# Patient Record
Sex: FEMALE | Race: WHITE
[De-identification: names, ages, dates, MRNs, and addresses within clinical notes are randomized per-mention and may not be internally consistent; named-entity substitution may affect disease eponyms.]

---

## 2016-11-11 VITALS — SYSTOLIC BLOOD PRESSURE: 140 MMHG | DIASTOLIC BLOOD PRESSURE: 70 MMHG

## 2017-05-04 ENCOUNTER — HOSPITAL ENCOUNTER (OUTPATIENT)
Dept: HOSPITAL 61 - LAB | Age: 27
Discharge: HOME | End: 2017-05-04
Attending: OBSTETRICS & GYNECOLOGY
Payer: COMMERCIAL

## 2017-05-04 DIAGNOSIS — Z32.01: Primary | ICD-10-CM

## 2017-05-04 LAB
BASOPHILS # BLD AUTO: 0.1 X10^3/UL (ref 0–0.2)
BASOPHILS NFR BLD: 1 % (ref 0–3)
EOSINOPHIL NFR BLD: 2 % (ref 0–3)
ERYTHROCYTE [DISTWIDTH] IN BLOOD BY AUTOMATED COUNT: 12.5 % (ref 11.5–14.5)
HCT VFR BLD CALC: 40.5 % (ref 36–47)
HGB BLD-MCNC: 13.9 G/DL (ref 12–15.5)
LYMPHOCYTES # BLD: 4 X10^3/UL (ref 1–4.8)
LYMPHOCYTES NFR BLD AUTO: 29 % (ref 24–48)
MCH RBC QN AUTO: 31 PG (ref 25–35)
MCHC RBC AUTO-ENTMCNC: 34 G/DL (ref 31–37)
MCV RBC AUTO: 92 FL (ref 79–100)
MONOCYTES NFR BLD: 8 % (ref 0–9)
NEUTROPHILS NFR BLD AUTO: 61 % (ref 31–73)
PLATELET # BLD AUTO: 222 X10^3/UL (ref 140–400)
RBC # BLD AUTO: 4.42 X10^6/UL (ref 3.5–5.4)
WBC # BLD AUTO: 13.8 X10^3/UL (ref 4–11)

## 2017-05-04 PROCEDURE — 86762 RUBELLA ANTIBODY: CPT

## 2017-05-04 PROCEDURE — 86850 RBC ANTIBODY SCREEN: CPT

## 2017-05-04 PROCEDURE — 85027 COMPLETE CBC AUTOMATED: CPT

## 2017-05-04 PROCEDURE — 87340 HEPATITIS B SURFACE AG IA: CPT

## 2017-05-04 PROCEDURE — 86901 BLOOD TYPING SEROLOGIC RH(D): CPT

## 2017-05-04 PROCEDURE — 87341 HEP B SURFACE AG NEUTRLZJ IA: CPT

## 2017-05-04 PROCEDURE — 86900 BLOOD TYPING SEROLOGIC ABO: CPT

## 2017-05-04 PROCEDURE — 36415 COLL VENOUS BLD VENIPUNCTURE: CPT

## 2017-05-04 PROCEDURE — 86703 HIV-1/HIV-2 1 RESULT ANTBDY: CPT

## 2017-05-04 PROCEDURE — 86593 SYPHILIS TEST NON-TREP QUANT: CPT

## 2017-05-08 ENCOUNTER — HOSPITAL ENCOUNTER (EMERGENCY)
Dept: HOSPITAL 61 - ER | Age: 27
Discharge: HOME | End: 2017-05-08
Payer: COMMERCIAL

## 2017-05-08 VITALS
DIASTOLIC BLOOD PRESSURE: 95 MMHG | DIASTOLIC BLOOD PRESSURE: 95 MMHG | DIASTOLIC BLOOD PRESSURE: 95 MMHG | SYSTOLIC BLOOD PRESSURE: 162 MMHG | DIASTOLIC BLOOD PRESSURE: 95 MMHG | SYSTOLIC BLOOD PRESSURE: 162 MMHG | SYSTOLIC BLOOD PRESSURE: 162 MMHG | DIASTOLIC BLOOD PRESSURE: 95 MMHG | DIASTOLIC BLOOD PRESSURE: 95 MMHG | SYSTOLIC BLOOD PRESSURE: 162 MMHG | DIASTOLIC BLOOD PRESSURE: 95 MMHG | SYSTOLIC BLOOD PRESSURE: 162 MMHG | SYSTOLIC BLOOD PRESSURE: 162 MMHG | SYSTOLIC BLOOD PRESSURE: 162 MMHG

## 2017-05-08 VITALS — BODY MASS INDEX: 40.65 KG/M2 | HEIGHT: 67 IN | WEIGHT: 259 LBS

## 2017-05-08 DIAGNOSIS — Z90.49: ICD-10-CM

## 2017-05-08 DIAGNOSIS — Z3A.01: ICD-10-CM

## 2017-05-08 DIAGNOSIS — O46.91: Primary | ICD-10-CM

## 2017-05-08 LAB
BASOPHILS # BLD AUTO: 0.1 X10^3/UL (ref 0–0.2)
BASOPHILS NFR BLD: 1 % (ref 0–3)
EOSINOPHIL NFR BLD: 2 % (ref 0–3)
ERYTHROCYTE [DISTWIDTH] IN BLOOD BY AUTOMATED COUNT: 12.8 % (ref 11.5–14.5)
HCT VFR BLD CALC: 43.5 % (ref 36–47)
HGB BLD-MCNC: 15.2 G/DL (ref 12–15.5)
LYMPHOCYTES # BLD: 3.5 X10^3/UL (ref 1–4.8)
LYMPHOCYTES NFR BLD AUTO: 28 % (ref 24–48)
MCH RBC QN AUTO: 32 PG (ref 25–35)
MCHC RBC AUTO-ENTMCNC: 35 G/DL (ref 31–37)
MCV RBC AUTO: 91 FL (ref 79–100)
MONOCYTES NFR BLD: 8 % (ref 0–9)
NEUTROPHILS NFR BLD AUTO: 61 % (ref 31–73)
PLATELET # BLD AUTO: 249 X10^3/UL (ref 140–400)
RBC # BLD AUTO: 4.78 X10^6/UL (ref 3.5–5.4)
WBC # BLD AUTO: 12.4 X10^3/UL (ref 4–11)

## 2017-05-08 PROCEDURE — 85027 COMPLETE CBC AUTOMATED: CPT

## 2017-05-08 PROCEDURE — 76817 TRANSVAGINAL US OBSTETRIC: CPT

## 2017-05-08 PROCEDURE — 76801 OB US < 14 WKS SINGLE FETUS: CPT

## 2017-05-08 PROCEDURE — 36415 COLL VENOUS BLD VENIPUNCTURE: CPT

## 2017-05-08 PROCEDURE — 84702 CHORIONIC GONADOTROPIN TEST: CPT

## 2017-05-08 PROCEDURE — 81025 URINE PREGNANCY TEST: CPT

## 2017-05-08 PROCEDURE — 86901 BLOOD TYPING SEROLOGIC RH(D): CPT

## 2017-05-08 PROCEDURE — 86900 BLOOD TYPING SEROLOGIC ABO: CPT

## 2017-05-08 NOTE — RAD
Indication: Vaginal bleeding and pregnancy.



Transabdominal and transvaginal sonography was performed. The uterus measures

9.7 x 5.0 x 5.5 cm. The endometrium is thickened measuring 13 mm. No

myometrial mass is identified. No intrauterine gestational sac is present.

Adnexal evaluation demonstrates the right ovary to measure 2.4 x 1.7 x 2.1 cm.

The left ovary measures 3.1 x 2.3 cm and contains a 1.8 cm cyst. No free fluid

or other mass is detected.



Impression: No evidence of intrauterine or ectopic pregnancy. There is some

thickening of the endometrium which could be a decidual reaction from early

pregnancy. Follow-up could be performed. Patient does have a 1.8 cm left

ovarian cyst.

## 2017-05-08 NOTE — ED.ADGEN
Past Medical History


Past Medical History:  No Pertinent History


Past Surgical History:  Cholecystectomy


Alcohol Use:  None


Drug Use:  None





Adult General


Chief Complaint


Chief Complaint:  VAGINAL BLEEDING PREGNANCY





HPI


HPI


Patient is a 26  year old G3, P2 1 pregnant female with estimated gestation 5 

week who presents with intermittent vaginal bleeding with dull burning pelvic 

pain since yesterday. Patient ports minimal bleeding, which has been both dark 

brown and bright red at times. She currently denies any pain or bleeding. She 

denies dizziness lightheadedness or shortness of breath. Patient has been 

working with her fertility specialist/OB Dr. Page and was taking Clomid and 

Glucophage prior to conceiving and is currently on Provera. She contacted her OB

/GYN's office and was instructed to come to the emergency department today.





Review of Systems


Review of Systems


ROS as per [facility]





Allergies


Allergies





Allergies








Coded Allergies Type Severity Reaction Last Updated Verified


 


  No Known Drug Allergies    11/11/16 No











Physical Exam


Physical Exam





Constitutional: Well developed, well nourished, no acute distress, non-toxic 

appearance. []


HENT: Normocephalic, atraumatic, bilateral external ears normal, oropharynx 

moist, no oral exudates, nose normal. []


Eyes: PERRLA, EOMI, conjunctiva normal, no discharge. [] 


Neck: Normal range of motion, no tenderness, supple, no stridor. [] 


Cardiovascular:Heart rate regular rhythm, no murmur []


Lungs & Thorax:  Bilateral breath sounds clear to auscultation []


Abdomen: Bowel sounds normal, soft, no tenderness, no masses, no pulsatile 

masses. [] 


Skin: Warm, dry, no erythema, no rash. [] 


Back: No tenderness, no CVA tenderness. [] 


Extremities: No tenderness, no cyanosis, no clubbing, ROM intact, no edema. [] 


Neurologic: Alert and oriented X 3, normal motor function, normal sensory 

function, no focal deficits noted. []


Psychologic: Affect normal, judgement normal, mood normal. []





Current Patient Data


Vital Signs





 Vital Signs








  Date Time  Temp Pulse Resp B/P (MAP) Pulse Ox O2 Delivery O2 Flow Rate FiO2


 


5/8/17 12:05 98.6 106 16 162/95 (117) 98 Room Air  





 98.6       








Lab Values





 Laboratory Tests








Test


  5/8/17


11:24 5/8/17


12:25


 


POC Urine HCG, Qualitative


  Hcg positive


(Negative) 


 


 


White Blood Count


  


  12.4 x10^3/uL


(4.0-11.0)  H


 


Red Blood Count


  


  4.78 x10^6/uL


(3.50-5.40)


 


Hemoglobin


  


  15.2 g/dL


(12.0-15.5)


 


Hematocrit


  


  43.5 %


(36.0-47.0)


 


Mean Corpuscular Volume


  


  91 fL ()


 


 


Mean Corpuscular Hemoglobin  32 pg (25-35)  


 


Mean Corpuscular Hemoglobin


Concent 


  35 g/dL


(31-37)


 


Red Cell Distribution Width


  


  12.8 %


(11.5-14.5)


 


Platelet Count


  


  249 x10^3/uL


(140-400)


 


Neutrophils (%) (Auto)  61 % (31-73)  


 


Lymphocytes (%) (Auto)  28 % (24-48)  


 


Monocytes (%) (Auto)  8 % (0-9)  


 


Eosinophils (%) (Auto)  2 % (0-3)  


 


Basophils (%) (Auto)  1 % (0-3)  


 


Neutrophils # (Auto)


  


  7.6 x10^3uL


(1.8-7.7)


 


Lymphocytes # (Auto)


  


  3.5 x10^3/uL


(1.0-4.8)


 


Monocytes # (Auto)


  


  0.9 x10^3/uL


(0.0-1.1)


 


Eosinophils # (Auto)


  


  0.3 x10^3/uL


(0.0-0.7)


 


Basophils # (Auto)


  


  0.1 x10^3/uL


(0.0-0.2)


 


Maternal Serum HCG Beta


Subunit 


  148 mIU/mL


(0-6)  H





 Laboratory Tests


5/8/17 12:25














EKG


EKG


[]





Radiology/Procedures


Radiology/Procedures


[The ultrasound less than 14 weeks: No evidence of IUP or adnexal mass 

concerning for ectopic pregnancy.]


Impressions:


Early pregnancy versus threatened miscarriage versus ectopic pregnancy.





Course & Med Decision Making


Course & Med Decision Making


Pertinent Labs and Imaging studies reviewed. (See chart for details)





[Case reviewed with Dr. Page. Conditions are to return home follow-up in 

clinic in one week. Signs and symptoms of ectopic pregnancy discussed in 

detail. Return precautions reviewed.]





Dragon Disclaimer


Dragon Disclaimer


This electronic medical record was generated, in whole or in part, using a 

voice recognition dictation system.











JASON LECHUGA DO May 8, 2017 12:38

## 2019-03-20 ENCOUNTER — HOSPITAL ENCOUNTER (OUTPATIENT)
Dept: HOSPITAL 61 - KCIC US | Age: 29
Discharge: HOME | End: 2019-03-20
Attending: FAMILY MEDICINE
Payer: COMMERCIAL

## 2019-03-20 DIAGNOSIS — O46.8X1: Primary | ICD-10-CM

## 2019-03-20 DIAGNOSIS — Z3A.01: ICD-10-CM

## 2019-03-20 PROCEDURE — 76817 TRANSVAGINAL US OBSTETRIC: CPT

## 2019-03-20 PROCEDURE — 76801 OB US < 14 WKS SINGLE FETUS: CPT

## 2019-03-20 NOTE — [facility]
Indication:Bleeding for one day.

 

TECHNIQUE: Ultrasound OB less than 14 weeks.

 

COMPARISON: None

 

FINDINGS:

The uterus is anteverted and measures 11.4 x 5.6 x 7.0 cm (nodule, AP, 

transverse). Cervix is closed. Single intrauterine gestation sac is seen 

with sac diameter of 1.2 cm corresponding to gestation age of 6 weeks 0 

days. No yolk sac or fetal pole is seen. Left ovary measures 2.5 x 2.0 x 

1.9 cm and shows blood flow. The right ovary is not visualized. No free 

pelvic fluid.

 

IMPRESSION:

1. Single intrauterine gestation sac corresponding to gestation age of 6 

weeks 0 days without visualization of fetal pole or yolk sac. Follow-up 

ultrasound recommended to document fetal pole.

2. Right ovary not seen.

 

Electronically signed by: Jeffrey Dow DO (3/20/2019 3:25 PM) [facility]

## 2019-04-10 ENCOUNTER — HOSPITAL ENCOUNTER (OUTPATIENT)
Dept: HOSPITAL 61 - US | Age: 29
Discharge: HOME | End: 2019-04-10
Attending: OBSTETRICS & GYNECOLOGY
Payer: COMMERCIAL

## 2019-04-10 DIAGNOSIS — O26.841: Primary | ICD-10-CM

## 2019-04-10 DIAGNOSIS — Z3A.09: ICD-10-CM

## 2019-04-10 PROCEDURE — 76815 OB US LIMITED FETUS(S): CPT

## 2019-04-10 NOTE — RAD
Examination: Obstetric ultrasound limited

 

HISTORY: History of no fetal heart tones

 

COMPARISON: 3/20/2019

 

FINDINGS:

 

 

Single living intrauterine pregnancy identified with fetal heart rate of 

182 bpm. The crown-rump length measures 2 cm corresponding to 8 weeks and 

4 days. Estimated date of delivery by ultrasound 11/16/2019. Clinical age 

is 9 weeks and 1 day with estimated delivery by LMP 11/12/2019.

 

 

IMPRESSION:

 

Single living intrauterine pregnancy with fetal heart rate of 182 bpm.

 

Electronically signed by: Lennox Garrison MD (4/10/2019 4:46 PM) [facility]

## 2019-06-28 ENCOUNTER — HOSPITAL ENCOUNTER (OUTPATIENT)
Dept: HOSPITAL 61 - KCIC US | Age: 29
Discharge: HOME | End: 2019-06-28
Attending: OBSTETRICS & GYNECOLOGY
Payer: COMMERCIAL

## 2019-06-28 DIAGNOSIS — O09.92: Primary | ICD-10-CM

## 2019-06-28 DIAGNOSIS — O26.842: ICD-10-CM

## 2019-06-28 DIAGNOSIS — Z3A.21: ICD-10-CM

## 2019-06-28 PROCEDURE — 76805 OB US >/= 14 WKS SNGL FETUS: CPT

## 2019-06-28 NOTE — [facility]
Examination: Obstetric ultrasound greater than 14 weeks

 

HISTORY: History of anatomical survey

 

COMPARISON: 4/10/2019.

 

 

 

FINDINGS:

 

Single living intrauterine pregnancy identified with fetal heart of 141 

bpm. Three-vessel cord is seen. 4 chamber heart seen.

 

Cord insertion, fluid in the fetal bladder, stomach, kidneys, spine, fetal

brain are identified. Fetal position is cephalic.

 

Cervical length is 6.2 cm.

 

Amniotic fluid index is 18.7 cm.

 

LMP 2/9/2019

 

Clinical age 19 weeks and 6 days with estimated date delivery 11/16/2019.

 

 

 

The biparietal diameter measures 5.2 cm corresponding to 21 weeks and 5 

days.

 

Head circumference measures 18.6 cm corresponding to 21 weeks and 0 days.

 

Abdominal circumference measures 15.5 cm corresponding to 20 weeks and 5 

days.

 

Femur length measures 3.5 cm corresponding 21 weeks and 1 day.

 

Head circumference to abdominal circumference ratio 1.2.

 

Estimated fetal weight 387 g

 

Gestational age by ultrasound 21 weeks and 1 day. Estimated date of 

delivery by ultrasound 11/7/2019.

 

IMPRESSION:

 

1. Single living intrauterine pregnancy as described above.

 

Electronically signed by: Lennox Garrison MD (6/28/2019 5:45 PM) [facility]

## 2019-08-14 ENCOUNTER — HOSPITAL ENCOUNTER (OUTPATIENT)
Dept: HOSPITAL 61 - LAB | Age: 29
Discharge: HOME | End: 2019-08-14
Attending: OBSTETRICS & GYNECOLOGY
Payer: COMMERCIAL

## 2019-08-14 DIAGNOSIS — O09.90: Primary | ICD-10-CM

## 2019-08-14 LAB
BASOPHILS # BLD AUTO: 0 X10^3/UL (ref 0–0.2)
BASOPHILS NFR BLD: 0 % (ref 0–3)
EOSINOPHIL NFR BLD: 0.2 X10^3/UL (ref 0–0.7)
EOSINOPHIL NFR BLD: 1 % (ref 0–3)
ERYTHROCYTE [DISTWIDTH] IN BLOOD BY AUTOMATED COUNT: 13.1 % (ref 11.5–14.5)
HCT VFR BLD CALC: 37.5 % (ref 36–47)
HGB BLD-MCNC: 12.9 G/DL (ref 12–15.5)
LYMPHOCYTES # BLD: 2.4 X10^3/UL (ref 1–4.8)
LYMPHOCYTES NFR BLD AUTO: 19 % (ref 24–48)
MCH RBC QN AUTO: 31 PG (ref 25–35)
MCHC RBC AUTO-ENTMCNC: 34 G/DL (ref 31–37)
MCV RBC AUTO: 90 FL (ref 79–100)
MONO #: 0.6 X10^3/UL (ref 0–1.1)
MONOCYTES NFR BLD: 5 % (ref 0–9)
NEUT #: 9.1 X10^3/UL (ref 1.8–7.7)
NEUTROPHILS NFR BLD AUTO: 74 % (ref 31–73)
PLATELET # BLD AUTO: 196 X10^3/UL (ref 140–400)
RBC # BLD AUTO: 4.15 X10^6/UL (ref 3.5–5.4)
WBC # BLD AUTO: 12.4 X10^3/UL (ref 4–11)

## 2019-08-14 PROCEDURE — 82950 GLUCOSE TEST: CPT

## 2019-08-14 PROCEDURE — 36415 COLL VENOUS BLD VENIPUNCTURE: CPT

## 2019-08-14 PROCEDURE — 85025 COMPLETE CBC W/AUTO DIFF WBC: CPT

## 2019-08-22 ENCOUNTER — HOSPITAL ENCOUNTER (OUTPATIENT)
Dept: HOSPITAL 61 - LAB | Age: 29
Discharge: HOME | End: 2019-08-22
Attending: OBSTETRICS & GYNECOLOGY
Payer: COMMERCIAL

## 2019-08-22 DIAGNOSIS — O09.90: Primary | ICD-10-CM

## 2019-08-22 PROCEDURE — 82950 GLUCOSE TEST: CPT

## 2019-08-22 PROCEDURE — 36415 COLL VENOUS BLD VENIPUNCTURE: CPT

## 2019-08-22 PROCEDURE — 82947 ASSAY GLUCOSE BLOOD QUANT: CPT

## 2019-10-10 ENCOUNTER — HOSPITAL ENCOUNTER (OUTPATIENT)
Dept: HOSPITAL 61 - 3 SO LND | Age: 29
Setting detail: OBSERVATION
Discharge: HOME | End: 2019-10-10
Attending: OBSTETRICS & GYNECOLOGY | Admitting: OBSTETRICS & GYNECOLOGY
Payer: COMMERCIAL

## 2019-10-10 DIAGNOSIS — Z3A.34: ICD-10-CM

## 2019-10-10 DIAGNOSIS — Z34.93: Primary | ICD-10-CM

## 2019-10-10 PROCEDURE — 76815 OB US LIMITED FETUS(S): CPT

## 2019-10-10 PROCEDURE — 59025 FETAL NON-STRESS TEST: CPT

## 2019-10-10 PROCEDURE — G0378 HOSPITAL OBSERVATION PER HR: HCPCS

## 2019-10-10 PROCEDURE — 76819 FETAL BIOPHYS PROFIL W/O NST: CPT

## 2019-10-10 PROCEDURE — G0379 DIRECT REFER HOSPITAL OBSERV: HCPCS

## 2019-10-10 NOTE — RAD
EXAM: OBSTETRIC ULTRASOUND WITH BIOPHYSICAL PROFILE.

 

HISTORY: Hypertension in pregnancy.

 

COMPARISON: None.

 

FINDINGS: Sonographic evaluation of the uterus, fetus and maternal pelvis 

was performed with biophysical profile.

 

There is a single fetus in vertex presentation. Fetal heart rate is 144 

bpm. Estimated gestational age based on measurements is 38 weeks one day. 

Biparietal diameter, head circumference, abdominal circumference, and 

femur length are commensurate. Estimated fetal weight is 3304 g.

 

The placenta is posterior. There is no evidence of placenta previa. 

Amniotic fluid volume appears normal with amniotic fluid index 12.2 cm.

 

Biophysical profile score: 8/8.

Fetal breathin.

Fetal tone: 2.

Fetal movement: 2.

Amniotic fluid volume: 2.

 

The maternal adnexa are obscured by positioning currently.

 

IMPRESSION: 

1. Biophysical profile score: 8/8.

2. Single fetus in vertex presentation. Fetal heart rate 144 bpm. 

Estimated gestational age based on measurements 38 weeks one day.

 

Electronically signed by: ARLEN Lopez MD (10/10/2019 2:11 PM) 

[facility]

## 2019-10-10 NOTE — RAD
EXAM: OBSTETRIC ULTRASOUND WITH BIOPHYSICAL PROFILE.

 

HISTORY: Hypertension in pregnancy.

 

COMPARISON: None.

 

FINDINGS: Sonographic evaluation of the uterus, fetus and maternal pelvis 

was performed with biophysical profile.

 

There is a single fetus in vertex presentation. Fetal heart rate is 144 

bpm. Estimated gestational age based on measurements is 38 weeks one day. 

Biparietal diameter, head circumference, abdominal circumference, and 

femur length are commensurate. Estimated fetal weight is 3304 g.

 

The placenta is posterior. There is no evidence of placenta previa. 

Amniotic fluid volume appears normal with amniotic fluid index 12.2 cm.

 

Biophysical profile score: 8/8.

Fetal breathin.

Fetal tone: 2.

Fetal movement: 2.

Amniotic fluid volume: 2.

 

The maternal adnexa are obscured by positioning currently.

 

IMPRESSION: 

1. Biophysical profile score: 8/8.

2. Single fetus in vertex presentation. Fetal heart rate 144 bpm. 

Estimated gestational age based on measurements 38 weeks one day.

 

Electronically signed by: ARLEN Lopez MD (10/10/2019 2:11 PM) 

[facility]

## 2019-10-17 ENCOUNTER — HOSPITAL ENCOUNTER (OUTPATIENT)
Dept: HOSPITAL 61 - 3 SO LND | Age: 29
Setting detail: OBSERVATION
Discharge: HOME | End: 2019-10-17
Attending: OBSTETRICS & GYNECOLOGY | Admitting: OBSTETRICS & GYNECOLOGY
Payer: COMMERCIAL

## 2019-10-17 VITALS
DIASTOLIC BLOOD PRESSURE: 79 MMHG | SYSTOLIC BLOOD PRESSURE: 141 MMHG | SYSTOLIC BLOOD PRESSURE: 141 MMHG | SYSTOLIC BLOOD PRESSURE: 141 MMHG | DIASTOLIC BLOOD PRESSURE: 79 MMHG | DIASTOLIC BLOOD PRESSURE: 79 MMHG

## 2019-10-17 DIAGNOSIS — Z3A.35: ICD-10-CM

## 2019-10-17 DIAGNOSIS — O10.913: Primary | ICD-10-CM

## 2019-10-17 PROCEDURE — G0378 HOSPITAL OBSERVATION PER HR: HCPCS

## 2019-10-17 PROCEDURE — G0379 DIRECT REFER HOSPITAL OBSERV: HCPCS

## 2019-10-24 ENCOUNTER — HOSPITAL ENCOUNTER (OUTPATIENT)
Dept: HOSPITAL 61 - 3 SO LND | Age: 29
Setting detail: OBSERVATION
Discharge: HOME | End: 2019-10-24
Attending: OBSTETRICS & GYNECOLOGY | Admitting: OBSTETRICS & GYNECOLOGY
Payer: COMMERCIAL

## 2019-10-24 DIAGNOSIS — O16.9: Primary | ICD-10-CM

## 2019-10-24 DIAGNOSIS — Z3A.00: ICD-10-CM

## 2019-10-24 PROCEDURE — G0378 HOSPITAL OBSERVATION PER HR: HCPCS

## 2019-10-24 PROCEDURE — G0379 DIRECT REFER HOSPITAL OBSERV: HCPCS

## 2019-10-24 PROCEDURE — 59025 FETAL NON-STRESS TEST: CPT

## 2019-10-31 ENCOUNTER — HOSPITAL ENCOUNTER (OUTPATIENT)
Dept: HOSPITAL 61 - 3 SO LND | Age: 29
Setting detail: OBSERVATION
Discharge: HOME | End: 2019-10-31
Attending: OBSTETRICS & GYNECOLOGY | Admitting: OBSTETRICS & GYNECOLOGY
Payer: COMMERCIAL

## 2019-10-31 DIAGNOSIS — O10.913: Primary | ICD-10-CM

## 2019-10-31 DIAGNOSIS — Z3A.34: ICD-10-CM

## 2019-10-31 PROCEDURE — G0379 DIRECT REFER HOSPITAL OBSERV: HCPCS

## 2019-10-31 PROCEDURE — 59025 FETAL NON-STRESS TEST: CPT

## 2019-10-31 PROCEDURE — G0378 HOSPITAL OBSERVATION PER HR: HCPCS

## 2019-11-07 ENCOUNTER — HOSPITAL ENCOUNTER (INPATIENT)
Dept: HOSPITAL 61 - 3 SO LND | Age: 29
LOS: 4 days | Discharge: HOME | End: 2019-11-11
Attending: OBSTETRICS & GYNECOLOGY | Admitting: OBSTETRICS & GYNECOLOGY
Payer: COMMERCIAL

## 2019-11-07 VITALS — HEIGHT: 67 IN | BODY MASS INDEX: 42.69 KG/M2 | WEIGHT: 272 LBS

## 2019-11-07 VITALS — SYSTOLIC BLOOD PRESSURE: 125 MMHG | DIASTOLIC BLOOD PRESSURE: 58 MMHG

## 2019-11-07 DIAGNOSIS — Z3A.39: ICD-10-CM

## 2019-11-07 LAB
APTT PPP: YELLOW S
BACTERIA #/AREA URNS HPF: (no result) /HPF
BASOPHILS # BLD AUTO: 0 X10^3/UL (ref 0–0.2)
BASOPHILS NFR BLD: 1 % (ref 0–3)
BILIRUB UR QL STRIP: NEGATIVE
EOSINOPHIL NFR BLD: 0.1 X10^3/UL (ref 0–0.7)
EOSINOPHIL NFR BLD: 1 % (ref 0–3)
ERYTHROCYTE [DISTWIDTH] IN BLOOD BY AUTOMATED COUNT: 13.6 % (ref 11.5–14.5)
FIBRINOGEN PPP-MCNC: CLEAR MG/DL
HCT VFR BLD CALC: 37.4 % (ref 36–47)
HGB BLD-MCNC: 13 G/DL (ref 12–15.5)
LYMPHOCYTES # BLD: 2.7 X10^3/UL (ref 1–4.8)
LYMPHOCYTES NFR BLD AUTO: 25 % (ref 24–48)
MCH RBC QN AUTO: 31 PG (ref 25–35)
MCHC RBC AUTO-ENTMCNC: 35 G/DL (ref 31–37)
MCV RBC AUTO: 89 FL (ref 79–100)
MONO #: 1.1 X10^3/UL (ref 0–1.1)
MONOCYTES NFR BLD: 10 % (ref 0–9)
NEUT #: 6.8 X10^3/UL (ref 1.8–7.7)
NEUTROPHILS NFR BLD AUTO: 64 % (ref 31–73)
NITRITE UR QL STRIP: NEGATIVE
PH UR STRIP: 6.5 [PH]
PLATELET # BLD AUTO: 183 X10^3/UL (ref 140–400)
PROT UR STRIP-MCNC: NEGATIVE MG/DL
RBC # BLD AUTO: 4.19 X10^6/UL (ref 3.5–5.4)
RBC #/AREA URNS HPF: 0 /HPF (ref 0–2)
SQUAMOUS #/AREA URNS LPF: (no result) /LPF
UROBILINOGEN UR-MCNC: 0.2 MG/DL
WBC # BLD AUTO: 10.7 X10^3/UL (ref 4–11)
WBC #/AREA URNS HPF: (no result) /HPF (ref 0–4)

## 2019-11-07 PROCEDURE — G0378 HOSPITAL OBSERVATION PER HR: HCPCS

## 2019-11-07 PROCEDURE — 86900 BLOOD TYPING SEROLOGIC ABO: CPT

## 2019-11-07 PROCEDURE — 88307 TISSUE EXAM BY PATHOLOGIST: CPT

## 2019-11-07 PROCEDURE — 86592 SYPHILIS TEST NON-TREP QUAL: CPT

## 2019-11-07 PROCEDURE — 85025 COMPLETE CBC W/AUTO DIFF WBC: CPT

## 2019-11-07 PROCEDURE — 86901 BLOOD TYPING SEROLOGIC RH(D): CPT

## 2019-11-07 PROCEDURE — 86850 RBC ANTIBODY SCREEN: CPT

## 2019-11-07 PROCEDURE — 81001 URINALYSIS AUTO W/SCOPE: CPT

## 2019-11-07 PROCEDURE — 36415 COLL VENOUS BLD VENIPUNCTURE: CPT

## 2019-11-07 PROCEDURE — 85007 BL SMEAR W/DIFF WBC COUNT: CPT

## 2019-11-07 RX ADMIN — SODIUM CHLORIDE, SODIUM LACTATE, POTASSIUM CHLORIDE, AND CALCIUM CHLORIDE SCH MLS/HR: .6; .31; .03; .02 INJECTION, SOLUTION INTRAVENOUS at 18:14

## 2019-11-08 VITALS — SYSTOLIC BLOOD PRESSURE: 159 MMHG | DIASTOLIC BLOOD PRESSURE: 79 MMHG

## 2019-11-08 VITALS — SYSTOLIC BLOOD PRESSURE: 134 MMHG | DIASTOLIC BLOOD PRESSURE: 75 MMHG

## 2019-11-08 VITALS — SYSTOLIC BLOOD PRESSURE: 160 MMHG | DIASTOLIC BLOOD PRESSURE: 84 MMHG

## 2019-11-08 VITALS — DIASTOLIC BLOOD PRESSURE: 79 MMHG | SYSTOLIC BLOOD PRESSURE: 147 MMHG

## 2019-11-08 VITALS — SYSTOLIC BLOOD PRESSURE: 145 MMHG | DIASTOLIC BLOOD PRESSURE: 80 MMHG

## 2019-11-08 RX ADMIN — ONDANSETRON PRN MG: 2 INJECTION INTRAMUSCULAR; INTRAVENOUS at 14:28

## 2019-11-08 RX ADMIN — ONDANSETRON PRN MG: 2 INJECTION INTRAMUSCULAR; INTRAVENOUS at 21:50

## 2019-11-08 RX ADMIN — SODIUM CHLORIDE, SODIUM LACTATE, POTASSIUM CHLORIDE, AND CALCIUM CHLORIDE SCH MLS/HR: .6; .31; .03; .02 INJECTION, SOLUTION INTRAVENOUS at 17:04

## 2019-11-08 RX ADMIN — SODIUM CHLORIDE, SODIUM LACTATE, POTASSIUM CHLORIDE, AND CALCIUM CHLORIDE SCH MLS/HR: .6; .31; .03; .02 INJECTION, SOLUTION INTRAVENOUS at 17:21

## 2019-11-08 RX ADMIN — SODIUM CHLORIDE, SODIUM LACTATE, POTASSIUM CHLORIDE, AND CALCIUM CHLORIDE SCH MLS/HR: .6; .31; .03; .02 INJECTION, SOLUTION INTRAVENOUS at 09:48

## 2019-11-08 RX ADMIN — Medication SCH MG: at 17:00

## 2019-11-08 NOTE — OP
DATE OF SURGERY:  



PREOPERATIVE DIAGNOSES:

1.  A 39 weeks' intrauterine pregnancy.

2.  Chronic hypertension.

3.  Fetal intolerance to labor.



POSTOPERATIVE DIAGNOSES:

1.  A 39 weeks' intrauterine pregnancy.

2.  Chronic hypertension.

3.  Fetal intolerance to labor.



PROCEDURE:  Primary low-transverse  section.



SURGEON:  Nanette Page MD



ASSISTANT:  Dr. Niño.



ANESTHESIA:  Epidural.



ESTIMATED BLOOD LOSS:  700 mL.



COMPLICATIONS:  None.



FINDINGS:  Viable male infant, Apgar's 8 and 9, weight 8 pounds 10 ounces;

3-vessel cord placenta delivered manually intact.



INDICATIONS FOR PROCEDURE:  This is a 29-year-old  4, para 2, at 39

weeks, presented for induction of labor secondary to chronic hypertension.  The

patient was provided Cervidil overnight.  The following day, she was up to 2 cm.

 She dilated up to 3 cm and then began having fetal intolerance to labor.  The

patient was counseled on the risks, benefits, and expectations and voiced clear

understanding to proceed with primary low-transverse  section.



DESCRIPTION OF PROCEDURE:  The patient was taken to the surgery suite and placed

in the dorsal supine position.  She was prepped with ChloraPrep and draped in a

sterile fashion.  After adequate anesthesia, a Pfannenstiel skin incision was

made with the scalpel down to and through the fascia.  Fascia was extended

laterally using curved Paiz scissors.  Superior edge of the fascia was grasped

with 2 Kocher clamps and dissected through the abdominal rectus muscles using

blunt dissection along with Bovie cautery.  The same process took place

inferiorly.  The abdominal rectus muscle was dissected bluntly at the midline. 

The peritoneum was grasped with 2 hemostats and entered sharply with the

Metzenbaum scissors.  This incision was extended superiorly as well as

inferiorly.  The Jaya ring retractor was placed.  A low-transverse hysterotomy

incision was made with the scalpel down to the infant.  The hysterotomy incision

was extended laterally and superiorly digitally.  With the aid of fundal

pressure, the infant's head was delivered in a smooth atraumatic manner.  With

additional fundal pressure, the anterior shoulder was delivered followed by

posterior shoulder and rest of the male infant was delivered.  The infant was

suctioned with a bulb syringe orally and nasally.  Umbilical cord was clamped

twice and cut, and a viable male infant was handed to the waiting nursing staff.

 Umbilical cord blood was then obtained.  Three-vessel cord placenta was

delivered manually intact.  The uterus was then exteriorized and cleared of clot

and debris with moist lap.  The hysterotomy incision was re-approximated using

1-Vicryl suture in running locked fashion.  Imbricated layer of #1 Vicryl suture

was utilized in a running fashion for better hemostasis.  Figure-of-eight

sutures were placed on the left apex of the hysterotomy incision for better

hemostasis.  Uterus was palpated firm.  Fallopian tubes and ovaries appeared

normal bilaterally.  Posterior cul-de-sac was cleared of clot and debris with a

moist lap.  The uterus was then returned to the abdomen.  The pericolic gutters

were cleared of clot and debris with a moist lap.  Hysterotomy incision was

reviewed and it was hemostatic.  The Jaya ring retractor was removed.  The

peritoneum was re-approximated using #1 Vicryl suture in running fashion. 

Fascia was re-approximated using Stratafix in running fashion.  Skin was

re-approximated using 4-0 Vicryl sutures in subcuticular manner.  Prevena wound

VAC was placed.  The patient tolerated the procedure well and was taken to the

recovery room in stable condition.  Sponge and needle counts were correct x3.

 



______________________________

NANETTE PAGE MD



DR:  UNIQUE/alfredo  JOB#:  [id number] / [id number]

DD:  2019 16:47  DT:  2019 17:45

## 2019-11-08 NOTE — PDOC4
OB Operative Note


Date:  2019


PRE OP DIAGNOSIS:  NRFHT


POST OP DIAGNOSIS:  NRFHT


OPERATION PERFORMED:  [facility]


Surgeon


Dr. Page


Assistant


Dr. Niño


Anesthesia:  Regional (Epidural)


Blood Loss


700 ml


Specimen


placenta and infant


OB Findings:  Position (Vertex), Sex (Male), Apgar (8/9), Weight (8 Lb 10 oz)


Complications


none


Additional Remarks


pt. NANETTE Sahni Jr, MD           2019 16:48

## 2019-11-09 VITALS — SYSTOLIC BLOOD PRESSURE: 134 MMHG | DIASTOLIC BLOOD PRESSURE: 78 MMHG

## 2019-11-09 VITALS — SYSTOLIC BLOOD PRESSURE: 142 MMHG | DIASTOLIC BLOOD PRESSURE: 89 MMHG

## 2019-11-09 VITALS — DIASTOLIC BLOOD PRESSURE: 77 MMHG | SYSTOLIC BLOOD PRESSURE: 134 MMHG

## 2019-11-09 VITALS — SYSTOLIC BLOOD PRESSURE: 129 MMHG | DIASTOLIC BLOOD PRESSURE: 66 MMHG

## 2019-11-09 VITALS — DIASTOLIC BLOOD PRESSURE: 74 MMHG | SYSTOLIC BLOOD PRESSURE: 128 MMHG

## 2019-11-09 LAB
% BANDS: 5 % (ref 0–9)
% LYMPHS: 15 % (ref 24–48)
% MONOS: 6 % (ref 0–10)
% SEGS: 74 % (ref 35–66)
BASOPHILS # BLD AUTO: 0 X10^3/UL (ref 0–0.2)
BASOPHILS NFR BLD: 0 % (ref 0–3)
EOSINOPHIL NFR BLD: 0 % (ref 0–3)
EOSINOPHIL NFR BLD: 0 X10^3/UL (ref 0–0.7)
ERYTHROCYTE [DISTWIDTH] IN BLOOD BY AUTOMATED COUNT: 13.5 % (ref 11.5–14.5)
HCT VFR BLD CALC: 34 % (ref 36–47)
HGB BLD-MCNC: 11.6 G/DL (ref 12–15.5)
LYMPHOCYTES # BLD: 2.4 X10^3/UL (ref 1–4.8)
LYMPHOCYTES NFR BLD AUTO: 15 % (ref 24–48)
MCH RBC QN AUTO: 31 PG (ref 25–35)
MCHC RBC AUTO-ENTMCNC: 34 G/DL (ref 31–37)
MCV RBC AUTO: 90 FL (ref 79–100)
MONO #: 1.1 X10^3/UL (ref 0–1.1)
MONOCYTES NFR BLD: 7 % (ref 0–9)
NEUT #: 12.6 X10^3/UL (ref 1.8–7.7)
NEUTROPHILS NFR BLD AUTO: 78 % (ref 31–73)
PLATELET # BLD AUTO: 154 X10^3/UL (ref 140–400)
PLATELET # BLD EST: ADEQUATE 10*3/UL
RBC # BLD AUTO: 3.8 X10^6/UL (ref 3.5–5.4)
WBC # BLD AUTO: 16.1 X10^3/UL (ref 4–11)

## 2019-11-09 RX ADMIN — OXYCODONE HYDROCHLORIDE AND ACETAMINOPHEN PRN TAB: 5; 325 TABLET ORAL at 12:53

## 2019-11-09 RX ADMIN — IBUPROFEN PRN MG: 400 TABLET ORAL at 19:56

## 2019-11-09 RX ADMIN — DOCUSATE SODIUM PRN MG: 100 CAPSULE, LIQUID FILLED ORAL at 05:07

## 2019-11-09 RX ADMIN — OXYCODONE HYDROCHLORIDE AND ACETAMINOPHEN PRN TAB: 5; 325 TABLET ORAL at 08:43

## 2019-11-09 RX ADMIN — OXYCODONE HYDROCHLORIDE AND ACETAMINOPHEN PRN TAB: 5; 325 TABLET ORAL at 22:40

## 2019-11-09 RX ADMIN — IBUPROFEN PRN MG: 400 TABLET ORAL at 12:53

## 2019-11-09 RX ADMIN — Medication SCH MG: at 17:00

## 2019-11-09 RX ADMIN — DOCUSATE SODIUM PRN MG: 100 CAPSULE, LIQUID FILLED ORAL at 19:57

## 2019-11-09 RX ADMIN — SODIUM CHLORIDE, SODIUM LACTATE, POTASSIUM CHLORIDE, AND CALCIUM CHLORIDE SCH MLS/HR: .6; .31; .03; .02 INJECTION, SOLUTION INTRAVENOUS at 01:21

## 2019-11-09 RX ADMIN — OXYCODONE HYDROCHLORIDE AND ACETAMINOPHEN PRN TAB: 5; 325 TABLET ORAL at 17:33

## 2019-11-09 RX ADMIN — Medication SCH MG: at 08:00

## 2019-11-09 NOTE — PDOC
OB Progress Note


Date of Service


11/9/19


Time of Evaluation


0910


Notes


Pt. feeling well.  Pain controlled.  No complaints.


Lab





Laboratory Tests








Test


 11/7/19


17:30 11/7/19


17:57 11/9/19


04:55


 


Urine Collection Type Unknown   


 


Urine Color Yellow   


 


Urine Clarity Clear   


 


Urine pH 6.5   


 


Urine Specific Gravity 1.025   


 


Urine Protein


 Negative mg/dL


(NEG-TRACE) 


 





 


Urine Glucose (UA)


 Negative mg/dL


(NEG) 


 





 


Urine Ketones (Stick)


 Negative mg/dL


(NEG) 


 





 


Urine Blood Negative (NEG)   


 


Urine Nitrite Negative (NEG)   


 


Urine Bilirubin Negative (NEG)   


 


Urine Urobilinogen Dipstick


 0.2 mg/dL (0.2


mg/dL) 


 





 


Urine Leukocyte Esterase Negative (NEG)   


 


Urine RBC 0 /HPF (0-2)   


 


Urine WBC Occ /HPF (0-4)   


 


Urine Squamous Epithelial


Cells Few /LPF 


 


 





 


Urine Bacteria


 Few /HPF


(0-FEW) 


 





 


Urine Mucus Mod /LPF   


 


White Blood Count


 


 10.7 x10^3/uL


(4.0-11.0) 16.1 x10^3/uL


(4.0-11.0)


 


Red Blood Count


 


 4.19 x10^6/uL


(3.50-5.40) 3.80 x10^6/uL


(3.50-5.40)


 


Hemoglobin


 


 13.0 g/dL


(12.0-15.5) 11.6 g/dL


(12.0-15.5)


 


Hematocrit


 


 37.4 %


(36.0-47.0) 34.0 %


(36.0-47.0)


 


Mean Corpuscular Volume  89 fL ()  90 fL () 


 


Mean Corpuscular Hemoglobin  31 pg (25-35)  31 pg (25-35) 


 


Mean Corpuscular Hemoglobin


Concent 


 35 g/dL


(31-37) 34 g/dL


(31-37)


 


Red Cell Distribution Width


 


 13.6 %


(11.5-14.5) 13.5 %


(11.5-14.5)


 


Platelet Count


 


 183 x10^3/uL


(140-400) 154 x10^3/uL


(140-400)


 


Neutrophils (%) (Auto)  64 % (31-73)  78 % (31-73) 


 


Lymphocytes (%) (Auto)  25 % (24-48)  15 % (24-48) 


 


Monocytes (%) (Auto)  10 % (0-9)  7 % (0-9) 


 


Eosinophils (%) (Auto)  1 % (0-3)  0 % (0-3) 


 


Basophils (%) (Auto)  1 % (0-3)  0 % (0-3) 


 


Neutrophils # (Auto)


 


 6.8 x10^3/uL


(1.8-7.7) 12.6 x10^3/uL


(1.8-7.7)


 


Lymphocytes # (Auto)


 


 2.7 x10^3/uL


(1.0-4.8) 2.4 x10^3/uL


(1.0-4.8)


 


Monocytes # (Auto)


 


 1.1 x10^3/uL


(0.0-1.1) 1.1 x10^3/uL


(0.0-1.1)


 


Eosinophils # (Auto)


 


 0.1 x10^3/uL


(0.0-0.7) 0.0 x10^3/uL


(0.0-0.7)


 


Basophils # (Auto)


 


 0.0 x10^3/uL


(0.0-0.2) 0.0 x10^3/uL


(0.0-0.2)


 


Treponema pallidum Antibody


 


 Nonreactive


(Nonreactive) 





 


Segmented Neutrophils %   74 % (35-66) 


 


Band Neutrophils %   5 % (0-9) 


 


Lymphocytes %   15 % (24-48) 


 


Monocytes %   6 % (0-10) 


 


Platelet Estimate


 


 


 Adequate


(ADEQUATE)








Laboratory Tests








Test


 11/9/19


04:55


 


White Blood Count


 16.1 x10^3/uL


(4.0-11.0)


 


Red Blood Count


 3.80 x10^6/uL


(3.50-5.40)


 


Hemoglobin


 11.6 g/dL


(12.0-15.5)


 


Hematocrit


 34.0 %


(36.0-47.0)


 


Mean Corpuscular Volume 90 fL () 


 


Mean Corpuscular Hemoglobin 31 pg (25-35) 


 


Mean Corpuscular Hemoglobin


Concent 34 g/dL


(31-37)


 


Red Cell Distribution Width


 13.5 %


(11.5-14.5)


 


Platelet Count


 154 x10^3/uL


(140-400)


 


Neutrophils (%) (Auto) 78 % (31-73) 


 


Lymphocytes (%) (Auto) 15 % (24-48) 


 


Monocytes (%) (Auto) 7 % (0-9) 


 


Eosinophils (%) (Auto) 0 % (0-3) 


 


Basophils (%) (Auto) 0 % (0-3) 


 


Neutrophils # (Auto)


 12.6 x10^3/uL


(1.8-7.7)


 


Lymphocytes # (Auto)


 2.4 x10^3/uL


(1.0-4.8)


 


Monocytes # (Auto)


 1.1 x10^3/uL


(0.0-1.1)


 


Eosinophils # (Auto)


 0.0 x10^3/uL


(0.0-0.7)


 


Basophils # (Auto)


 0.0 x10^3/uL


(0.0-0.2)


 


Segmented Neutrophils % 74 % (35-66) 


 


Band Neutrophils % 5 % (0-9) 


 


Lymphocytes % 15 % (24-48) 


 


Monocytes % 6 % (0-10) 


 


Platelet Estimate


 Adequate


(ADEQUATE)








Medications





Current Medications


Sodium Chloride (Normal Saline Flush) 3 ml QSHIFT  PRN IV AFTER MEDS AND BLOOD 

DRAWS;  Start 11/7/19 at 17:30


Ringer's Solution 1,000 ml @  125 mls/hr Q8H IV  Last administered on 11/8/19at 

17:04;  Start 11/7/19 at 17:21


Nalbuphine HCl (Nubain) 5 mg PRN Q1HR  PRN IV Mild to moderate labor pain;  

Start 11/7/19 at 17:30


Nalbuphine HCl (Nubain) 10 mg PRN Q1HR  PRN IV Severe labor pain;  Start 11/7/19

at 17:30


Terbutaline Sulfate (Brethine) 0.25 mg 1X PRN  PRN SQ SEE COMMENTS;  Start 11/7/ 19 at 17:30;  Stop 11/8/19 at 17:29;  Status DC


Lidocaine HCl (Xylocaine 1% Pf 30ml Vial) 30 ml 1X PRN  PRN INJ SEE COMMENTS;  S

tart 11/7/19 at 17:30;  Stop 11/9/19 at 17:29


Oxytocin/Sodium Chloride 500 ml @ 0 mls/hr CONT  PRN IV SEE I/O RECORD Last 

administered on 11/8/19at 06:36;  Start 11/7/19 at 17:30


Oxytocin/Sodium Chloride 500 ml @ 0 mls/hr CONT PRN  PRN IV Post delivery ble

eding;  Start 11/7/19 at 17:30


Ibuprofen (Motrin) 800 mg PRN Q6HRS  PRN PO PAIN;  Start 11/7/19 at 17:30;  Stop

11/9/19 at 03:28;  Status DC


Dinoprostone (Cervidil) 10 mg 1X  ONCE VG  Last administered on 11/7/19at 18:14;

 Start 11/7/19 at 17:30;  Stop 11/7/19 at 17:31;  Status DC


Acetaminophen (Tylenol) 1,000 mg PRN Q6HRS  PRN PO MODERATE PAIN 4-6 Last 

administered on 11/7/19at 22:01;  Start 11/7/19 at 22:00


Diphenhydramine HCl (Benadryl) 25 mg PRN QHS  PRN PO INSOMNIA Last administered 

on 11/8/19at 00:33;  Start 11/7/19 at 22:00


Fentanyl Citrate (Fentanyl 2ml Vial) 100 mcg STK-MED ONCE .ROUTE ;  Start 

11/8/19 at 09:17;  Stop 11/8/19 at 09:17;  Status DC


Ropivacaine (Naropin 0.2%) 10 ml STK-MED ONCE .ROUTE ;  Start 11/8/19 at 09:17; 

Stop 11/8/19 at 09:17;  Status DC


Fentanyl Citrate 50 ml @ As Directed STK-MED ONCE .ROUTE ;  Start 11/8/19 at 

09:17;  Stop 11/8/19 at 09:17;  Status DC


Ephedrine Sulfate (Akovaz) 50 mg STK-MED ONCE .ROUTE ;  Start 11/8/19 at 10:03; 

Stop 11/8/19 at 10:03;  Status DC


Ringer's Solution 1,000 ml @  0 mls/hr Q0M ONCE IV ;  Start 11/8/19 at 12:33;  

Stop 11/8/19 at 12:40;  Status DC


Ephedrine Sulfate (ePHEDrine PF IN SALINE SYRINGE) 10 mg PRN Q2MIN  PRN IV IF 

SBP<90;  Start 11/8/19 at 12:45


Naloxone HCl (Narcan) 0.04 mg PRN Q1MIN  PRN IV SEE COMMENTS;  Start 11/8/19 at 

12:45


Fentanyl Citrate 50 ml @ 14 mls/hr CONT  PRN EPID PAIN Last administered on 

11/8/19at 12:44;  Start 11/8/19 at 12:45


Ondansetron HCl (Zofran) 4 mg PRN Q6HRS  PRN IV NAUSEA/VOMITING Last 

administered on 11/8/19at 21:50;  Start 11/8/19 at 12:45


Fentanyl Citrate 50 ml @ As Directed STK-MED ONCE .ROUTE ;  Start 11/8/19 at 

12:40;  Stop 11/8/19 at 12:40;  Status DC


Cefazolin Sodium 3 gm/Dextrose 100 ml @  200 mls/hr 1X  ONCE IV ;  Start 11/8/19

at 15:45;  Stop 11/8/19 at 16:14;  Status DC


Citric Acid/ Sodium Citrate (Bicitra) 30 ml 1X  ONCE PO  Last administered on 

11/8/19at 17:01;  Start 11/8/19 at 15:45;  Stop 11/8/19 at 15:46;  Status DC


Oxytocin (Pitocin) 10 unit STK-MED ONCE .ROUTE ;  Start 11/8/19 at 16:06;  Stop 

11/8/19 at 16:07;  Status DC


Morphine Sulfate (Morphine Preservative Free) 10 mg STK-MED ONCE .ROUTE ;  Start

11/8/19 at 16:21;  Stop 11/8/19 at 16:21;  Status DC


Sodium Chloride (Normal Saline Flush) 3 ml QSHIFT  PRN IV AFTER MEDS AND BLOOD 

DRAWS;  Start 11/8/19 at 17:00


Oxytocin/Sodium Chloride 500 ml @  125 mls/hr CONT  PRN IV EXCESSIVE POST-PARTUM

BLEEDING Last administered on 11/8/19at 17:04;  Start 11/8/19 at 17:00;  Stop 

11/9/19 at 00:59;  Status DC


Ibuprofen (Motrin) 800 mg PRN Q4HRS  PRN PO INFLAMMATION;  Start 11/8/19 at 

17:00;  Stop 11/9/19 at 03:28;  Status DC


Ondansetron HCl (Zofran) 4 mg PRN Q6HRS  PRN IV NAUSEA/VOMITING;  Start 11/8/19 

at 17:00


Docusate Sodium (Colace) 100 mg PRN BID  PRN PO CONSTIPATION Last administered 

on 11/9/19at 05:07;  Start 11/8/19 at 17:00


Al Hydroxide/Mg Hydroxide (Mylanta Plus Xs) 30 ml PRN Q4HRS  PRN PO HEARTBURN / 

GAS;  Start 11/8/19 at 17:00


Simethicone (Gas-X) 80 mg PRN AFTMEALHC  PRN PO GAS / BLOATING;  Start 11/8/19 

at 17:00


Diphenhydramine HCl (Benadryl Oral Elixir) 12.5 mg PRN Q6HRS  PRN PO ITCHING;  

Start 11/8/19 at 17:00


Ferrous Sulfate (Feosol) 325 mg BIDWMEALS PO ;  Start 11/8/19 at 17:00


Zolpidem Tartrate (Ambien) 5 mg PRN QHS  PRN PO INSOMNIA, MAY REPEAT X1;  Start 

11/8/19 at 17:00


Oxycodone/ Acetaminophen (Percocet 5/325) 2 tab PRN Q4HRS  PRN PO MODERATE PAIN,

SEVERE PAIN Last administered on 11/9/19at 08:43;  Start 11/8/19 at 17:00


Ketorolac Tromethamine (Toradol 30mg Vial) 30 mg PRN Q6HRS  PRN IV PAIN Last 

administered on 11/9/19at 05:07;  Start 11/8/19 at 17:00;  Stop 11/13/19 at 

16:59


Dexamethasone Sodium Phosphate (Decadron) 4 mg STK-MED ONCE .ROUTE ;  Start 

11/8/19 at 16:57;  Stop 11/8/19 at 16:57;  Status DC


Famotidine (Pepcid Vial) 20 mg STK-MED ONCE .ROUTE ;  Start 11/8/19 at 16:57;  

Stop 11/8/19 at 16:57;  Status DC


Metoclopramide HCl (Reglan Vial) 10 mg STK-MED ONCE .ROUTE ;  Start 11/8/19 at 

16:57;  Stop 11/8/19 at 16:57;  Status DC


Ondansetron HCl (Zofran) 4 mg STK-MED ONCE .ROUTE ;  Start 11/8/19 at 16:57;  

Stop 11/8/19 at 16:57;  Status DC


Nifedipine (Procardia Xl) 30 mg HS PO  Last administered on 11/8/19at 21:28;  

Start 11/8/19 at 21:00


Ibuprofen (Motrin) 800 mg PRN Q6HRS  PRN PO INFLAMMATION;  Start 11/9/19 at 

03:30





Active Scripts


Active


Reported


No Known Medications Prior To Admisstion (Info)  Each 1 Each MC


Exam


Abd; soft, mild tenderness, fundus firm


Prevena in place


Assessment


POD#1 s/p c/s


Plan of Care:  Continue current Tx, Mgmt











NANETTE ADAMS Jr, MD           Nov 9, 2019 09:11

## 2019-11-10 VITALS — SYSTOLIC BLOOD PRESSURE: 135 MMHG | DIASTOLIC BLOOD PRESSURE: 79 MMHG

## 2019-11-10 VITALS — DIASTOLIC BLOOD PRESSURE: 81 MMHG | SYSTOLIC BLOOD PRESSURE: 131 MMHG

## 2019-11-10 VITALS — SYSTOLIC BLOOD PRESSURE: 123 MMHG | DIASTOLIC BLOOD PRESSURE: 68 MMHG

## 2019-11-10 VITALS — SYSTOLIC BLOOD PRESSURE: 137 MMHG | DIASTOLIC BLOOD PRESSURE: 80 MMHG

## 2019-11-10 RX ADMIN — FUROSEMIDE SCH MG: 20 TABLET ORAL at 09:07

## 2019-11-10 RX ADMIN — OXYCODONE HYDROCHLORIDE AND ACETAMINOPHEN PRN TAB: 5; 325 TABLET ORAL at 18:17

## 2019-11-10 RX ADMIN — SIMETHICONE CHEW TAB 80 MG PRN MG: 80 TABLET ORAL at 13:19

## 2019-11-10 RX ADMIN — OXYCODONE HYDROCHLORIDE AND ACETAMINOPHEN PRN TAB: 5; 325 TABLET ORAL at 13:19

## 2019-11-10 RX ADMIN — OXYCODONE HYDROCHLORIDE AND ACETAMINOPHEN PRN TAB: 5; 325 TABLET ORAL at 22:28

## 2019-11-10 RX ADMIN — IBUPROFEN PRN MG: 400 TABLET ORAL at 04:24

## 2019-11-10 RX ADMIN — Medication SCH MG: at 08:00

## 2019-11-10 RX ADMIN — SIMETHICONE CHEW TAB 80 MG PRN MG: 80 TABLET ORAL at 05:11

## 2019-11-10 RX ADMIN — IBUPROFEN PRN MG: 400 TABLET ORAL at 22:28

## 2019-11-10 RX ADMIN — OXYCODONE HYDROCHLORIDE AND ACETAMINOPHEN PRN TAB: 5; 325 TABLET ORAL at 04:23

## 2019-11-10 RX ADMIN — DOCUSATE SODIUM PRN MG: 100 CAPSULE, LIQUID FILLED ORAL at 05:11

## 2019-11-10 RX ADMIN — OXYCODONE HYDROCHLORIDE AND ACETAMINOPHEN PRN TAB: 5; 325 TABLET ORAL at 09:07

## 2019-11-10 RX ADMIN — IBUPROFEN PRN MG: 400 TABLET ORAL at 13:19

## 2019-11-10 NOTE — PDOC
OB Progress Note


Date of Service


11/10/19


Time of Evaluation


0850


Notes


PT. feeling well.  Pain controlled.  Pt. breast feeding.


Lab





Laboratory Tests








Test


 11/9/19


04:55


 


White Blood Count


 16.1 x10^3/uL


(4.0-11.0)


 


Red Blood Count


 3.80 x10^6/uL


(3.50-5.40)


 


Hemoglobin


 11.6 g/dL


(12.0-15.5)


 


Hematocrit


 34.0 %


(36.0-47.0)


 


Mean Corpuscular Volume 90 fL () 


 


Mean Corpuscular Hemoglobin 31 pg (25-35) 


 


Mean Corpuscular Hemoglobin


Concent 34 g/dL


(31-37)


 


Red Cell Distribution Width


 13.5 %


(11.5-14.5)


 


Platelet Count


 154 x10^3/uL


(140-400)


 


Neutrophils (%) (Auto) 78 % (31-73) 


 


Lymphocytes (%) (Auto) 15 % (24-48) 


 


Monocytes (%) (Auto) 7 % (0-9) 


 


Eosinophils (%) (Auto) 0 % (0-3) 


 


Basophils (%) (Auto) 0 % (0-3) 


 


Neutrophils # (Auto)


 12.6 x10^3/uL


(1.8-7.7)


 


Lymphocytes # (Auto)


 2.4 x10^3/uL


(1.0-4.8)


 


Monocytes # (Auto)


 1.1 x10^3/uL


(0.0-1.1)


 


Eosinophils # (Auto)


 0.0 x10^3/uL


(0.0-0.7)


 


Basophils # (Auto)


 0.0 x10^3/uL


(0.0-0.2)


 


Segmented Neutrophils % 74 % (35-66) 


 


Band Neutrophils % 5 % (0-9) 


 


Lymphocytes % 15 % (24-48) 


 


Monocytes % 6 % (0-10) 


 


Platelet Estimate


 Adequate


(ADEQUATE)








Medications





Current Medications


Sodium Chloride (Normal Saline Flush) 3 ml QSHIFT  PRN IV AFTER MEDS AND BLOOD 

DRAWS;  Start 11/7/19 at 17:30;  Stop 11/9/19 at 12:00;  Status DC


Ringer's Solution 1,000 ml @  125 mls/hr Q8H IV  Last administered on 11/8/19at 

17:04;  Start 11/7/19 at 17:21;  Stop 11/9/19 at 16:27;  Status DC


Nalbuphine HCl (Nubain) 5 mg PRN Q1HR  PRN IV Mild to moderate labor pain;  

Start 11/7/19 at 17:30


Nalbuphine HCl (Nubain) 10 mg PRN Q1HR  PRN IV Severe labor pain;  Start 11/7/19

at 17:30


Terbutaline Sulfate (Brethine) 0.25 mg 1X PRN  PRN SQ SEE COMMENTS;  Start 

11/7/19 at 17:30;  Stop 11/8/19 at 17:29;  Status DC


Lidocaine HCl (Xylocaine 1% Pf 30ml Vial) 30 ml 1X PRN  PRN INJ SEE COMMENTS;  

Start 11/7/19 at 17:30;  Stop 11/9/19 at 17:29;  Status DC


Oxytocin/Sodium Chloride 500 ml @ 0 mls/hr CONT  PRN IV SEE I/O RECORD Last 

administered on 11/8/19at 06:36;  Start 11/7/19 at 17:30


Oxytocin/Sodium Chloride 500 ml @ 0 mls/hr CONT PRN  PRN IV Post delivery 

bleeding;  Start 11/7/19 at 17:30


Ibuprofen (Motrin) 800 mg PRN Q6HRS  PRN PO PAIN;  Start 11/7/19 at 17:30;  Stop

11/9/19 at 03:28;  Status DC


Dinoprostone (Cervidil) 10 mg 1X  ONCE VG  Last administered on 11/7/19at 18:14;

 Start 11/7/19 at 17:30;  Stop 11/7/19 at 17:31;  Status DC


Acetaminophen (Tylenol) 1,000 mg PRN Q6HRS  PRN PO MODERATE PAIN 4-6 Last 

administered on 11/7/19at 22:01;  Start 11/7/19 at 22:00


Diphenhydramine HCl (Benadryl) 25 mg PRN QHS  PRN PO INSOMNIA Last administered 

on 11/8/19at 00:33;  Start 11/7/19 at 22:00


Fentanyl Citrate (Fentanyl 2ml Vial) 100 mcg STK-MED ONCE .ROUTE ;  Start 

11/8/19 at 09:17;  Stop 11/8/19 at 09:17;  Status DC


Ropivacaine (Naropin 0.2%) 10 ml STK-MED ONCE .ROUTE ;  Start 11/8/19 at 09:17; 

Stop 11/8/19 at 09:17;  Status DC


Fentanyl Citrate 50 ml @ As Directed STK-MED ONCE .ROUTE ;  Start 11/8/19 at 

09:17;  Stop 11/8/19 at 09:17;  Status DC


Ephedrine Sulfate (Akovaz) 50 mg STK-MED ONCE .ROUTE ;  Start 11/8/19 at 10:03; 

Stop 11/8/19 at 10:03;  Status DC


Ringer's Solution 1,000 ml @  0 mls/hr Q0M ONCE IV ;  Start 11/8/19 at 12:33;  

Stop 11/8/19 at 12:40;  Status DC


Ephedrine Sulfate (ePHEDrine PF IN SALINE SYRINGE) 10 mg PRN Q2MIN  PRN IV IF 

SBP<90;  Start 11/8/19 at 12:45


Naloxone HCl (Narcan) 0.04 mg PRN Q1MIN  PRN IV SEE COMMENTS;  Start 11/8/19 at 

12:45


Fentanyl Citrate 50 ml @ 14 mls/hr CONT  PRN EPID PAIN Last administered on 

11/8/19at 12:44;  Start 11/8/19 at 12:45


Ondansetron HCl (Zofran) 4 mg PRN Q6HRS  PRN IV NAUSEA/VOMITING Last 

administered on 11/8/19at 21:50;  Start 11/8/19 at 12:45;  Stop 11/9/19 at 

12:00;  Status DC


Fentanyl Citrate 50 ml @ As Directed STK-MED ONCE .ROUTE ;  Start 11/8/19 at 

12:40;  Stop 11/8/19 at 12:40;  Status DC


Cefazolin Sodium 3 gm/Dextrose 100 ml @  200 mls/hr 1X  ONCE IV ;  Start 11/8/19

at 15:45;  Stop 11/8/19 at 16:14;  Status DC


Citric Acid/ Sodium Citrate (Bicitra) 30 ml 1X  ONCE PO  Last administered on 

11/8/19at 17:01;  Start 11/8/19 at 15:45;  Stop 11/8/19 at 15:46;  Status DC


Oxytocin (Pitocin) 10 unit STK-MED ONCE .ROUTE ;  Start 11/8/19 at 16:06;  Stop 

11/8/19 at 16:07;  Status DC


Morphine Sulfate (Morphine Preservative Free) 10 mg STK-MED ONCE .ROUTE ;  Start

11/8/19 at 16:21;  Stop 11/8/19 at 16:21;  Status DC


Sodium Chloride (Normal Saline Flush) 3 ml QSHIFT  PRN IV AFTER MEDS AND BLOOD 

DRAWS;  Start 11/8/19 at 17:00


Oxytocin/Sodium Chloride 500 ml @  125 mls/hr CONT  PRN IV EXCESSIVE POST-PARTUM

BLEEDING Last administered on 11/8/19at 17:04;  Start 11/8/19 at 17:00;  Stop 

11/9/19 at 00:59;  Status DC


Ibuprofen (Motrin) 800 mg PRN Q4HRS  PRN PO INFLAMMATION;  Start 11/8/19 at 

17:00;  Stop 11/9/19 at 03:28;  Status DC


Ondansetron HCl (Zofran) 4 mg PRN Q6HRS  PRN IV NAUSEA/VOMITING;  Start 11/8/19 

at 17:00


Docusate Sodium (Colace) 100 mg PRN BID  PRN PO CONSTIPATION Last administered 

on 11/10/19at 05:11;  Start 11/8/19 at 17:00


Al Hydroxide/Mg Hydroxide (Mylanta Plus Xs) 30 ml PRN Q4HRS  PRN PO HEARTBURN / 

GAS;  Start 11/8/19 at 17:00


Simethicone (Gas-X) 80 mg PRN AFTMEALHC  PRN PO GAS / BLOATING Last administered

on 11/10/19at 05:11;  Start 11/8/19 at 17:00


Diphenhydramine HCl (Benadryl Oral Elixir) 12.5 mg PRN Q6HRS  PRN PO ITCHING;  

Start 11/8/19 at 17:00


Ferrous Sulfate (Feosol) 325 mg BIDWMEALS PO ;  Start 11/8/19 at 17:00


Zolpidem Tartrate (Ambien) 5 mg PRN QHS  PRN PO INSOMNIA, MAY REPEAT X1;  Start 

11/8/19 at 17:00


Oxycodone/ Acetaminophen (Percocet 5/325) 2 tab PRN Q4HRS  PRN PO MODERATE PAIN,

SEVERE PAIN Last administered on 11/10/19at 04:23;  Start 11/8/19 at 17:00


Ketorolac Tromethamine (Toradol 30mg Vial) 30 mg PRN Q6HRS  PRN IV PAIN Last 

administered on 11/9/19at 05:07;  Start 11/8/19 at 17:00;  Stop 11/13/19 at 

16:59


Dexamethasone Sodium Phosphate (Decadron) 4 mg STK-MED ONCE .ROUTE ;  Start 

11/8/19 at 16:57;  Stop 11/8/19 at 16:57;  Status DC


Famotidine (Pepcid Vial) 20 mg STK-MED ONCE .ROUTE ;  Start 11/8/19 at 16:57;  

Stop 11/8/19 at 16:57;  Status DC


Metoclopramide HCl (Reglan Vial) 10 mg STK-MED ONCE .ROUTE ;  Start 11/8/19 at 

16:57;  Stop 11/8/19 at 16:57;  Status DC


Ondansetron HCl (Zofran) 4 mg STK-MED ONCE .ROUTE ;  Start 11/8/19 at 16:57;  

Stop 11/8/19 at 16:57;  Status DC


Nifedipine (Procardia Xl) 30 mg HS PO  Last administered on 11/9/19at 19:56;  

Start 11/8/19 at 21:00


Ibuprofen (Motrin) 800 mg PRN Q6HRS  PRN PO INFLAMMATION Last administered on 

11/10/19at 04:24;  Start 11/9/19 at 03:30





Active Scripts


Active


Reported


No Known Medications Prior To Admisstion (Info)  Each 1 Each MC


Exam


Abd: soft, mild tenderness, fundus firm


Prevena in place


Assessment


POD#2 s/p c/s


Plan of Care:  Continue current Tx, Mgmt (Lasix for LE edema.)











NANETTE ADAMS Jr, MD          Nov 10, 2019 08:52

## 2019-11-11 VITALS
DIASTOLIC BLOOD PRESSURE: 81 MMHG | DIASTOLIC BLOOD PRESSURE: 81 MMHG | SYSTOLIC BLOOD PRESSURE: 140 MMHG | DIASTOLIC BLOOD PRESSURE: 81 MMHG | SYSTOLIC BLOOD PRESSURE: 140 MMHG | SYSTOLIC BLOOD PRESSURE: 140 MMHG

## 2019-11-11 VITALS — DIASTOLIC BLOOD PRESSURE: 100 MMHG | SYSTOLIC BLOOD PRESSURE: 177 MMHG

## 2019-11-11 RX ADMIN — Medication SCH MG: at 16:36

## 2019-11-11 RX ADMIN — OXYCODONE HYDROCHLORIDE AND ACETAMINOPHEN PRN TAB: 5; 325 TABLET ORAL at 16:36

## 2019-11-11 RX ADMIN — FUROSEMIDE SCH MG: 20 TABLET ORAL at 10:33

## 2019-11-11 RX ADMIN — DOCUSATE SODIUM PRN MG: 100 CAPSULE, LIQUID FILLED ORAL at 10:33

## 2019-11-11 RX ADMIN — OXYCODONE HYDROCHLORIDE AND ACETAMINOPHEN PRN TAB: 5; 325 TABLET ORAL at 12:56

## 2019-11-11 RX ADMIN — IBUPROFEN PRN MG: 400 TABLET ORAL at 12:56

## 2019-11-11 RX ADMIN — IBUPROFEN PRN MG: 400 TABLET ORAL at 04:50

## 2019-11-11 RX ADMIN — OXYCODONE HYDROCHLORIDE AND ACETAMINOPHEN PRN TAB: 5; 325 TABLET ORAL at 04:50

## 2019-11-11 NOTE — PDOC3
OB DISCHARGE SUMMARY


DATE OF ADMISSION:  


19


DATE OF DISCHARGE:  


19


REASON FOR ADMISSION:   section, Other (CHTN)


INTRAPARTUM PROCEDURES:  : Low Cerv Trans (FTD)


DISCHARGE DIAGNOSIS:  Term Pregnancy Delivered


DISCHARGE INFORMATION:  Activity (ad hasmukh), Diet (regular), Instructions (pelvic 

rest x 6 wks, no driving x 2 wks, no lifting> 20 lbs. x 6 wks)


HOSPITAL COURSE


Term gestation with CHTN delivered  section without complications.











NANETTE ADAMS Jr, MD          2019 08:19

## 2019-11-11 NOTE — DISCH
DISCHARGE INSTRUCTIONS


Condition on Discharge


Condition on Discharge:  Stable





Activity After Discharge


Activity Instructions for Disc:  Activity as tolerated


Lifting Instructions after Dis:  No heavy lifting


Driving Instructions after Dis:  No driving for 2 weeks





Diet after Discharge


Diet after Discharge:  Regular





Contacting the DRScottie after DC


Call your doctor for:  Concerns you may have





Follow-Up


Follow up with:  Dr. Page in 2 wks











NANETTE PAGE Jr, MD          Nov 11, 2019 08:22

## 2019-11-12 NOTE — PATHOLOGY
[facility] Accession Number: [id number]

.                                                                01

Material submitted:                                        .

placenta - PLACENTA 38 WEEKS 6 DAYS

.                                                                01

Clinical history:                                          .

None-reassuring fetal heart rate; ; gestational age 38 weeks;

Apgars 8, 9

.                                                                02

**********************************************************************

Diagnosis:

584 gram early term placenta of an estimated 38 - 39 weeks gestation with

attached membranes and umbilical cord:

- Placental infarct with associated intervillous thrombus.

(JPM:pit 2019)

[facility]  2019  1747 Local

**********************************************************************

.                                                                02

Comment:

There is no evidence of an acute chorioamnionitis or villitis.

(JP:pit 2019)

.                                                                02

Electronically signed:                                     .

Tej Saini MD, Pathologist

NPI- [id number]

.                                                                01

Gross description:                                         .

The specimen is received in formalin, labeled "Leidy Tirado,

placenta".  Received is a wynn placenta with attached fetal membranes

and umbilical cord with a trimmed placental weight of 584 g and measuring

17.7 x 15.4 x 3.3 cm in greatest dimensions.  The fetal membranes are

pink-gray and translucent in appearance, and the site of membrane rupture

is 8.2 cm from the placental margin.  The fetal surface is intact

displaying a normal arborizing vasculature pattern.  The trivascular

umbilical cord measures 33.4 cm in length by up to 2.1 cm in diameter and

inserts centrally, 5.3 cm from the closest placental margin.  The

umbilical cord is pale tan in appearance with minimal helical twisting.

The maternal surface is intact and complete; a slight amount of adherent

blood coagulum is seen on the surface.  Sectioning reveals red-brown cut

surfaces displaying two pink-tan to hemorrhagic-appearing lesions

measuring 0.5 and 2.5 cm, which encompass approximately 5% of the total

placental volume.  The specimen is submitted representatively as follows:

.

A1     proximal umbilical cord and surface vessels

A2     umbilical cord and membrane roll

A3-A4  representative section of largest lesion, bisected

A5     representative section of smaller lesion.

(CAA; 2019)

[facility]  2019  1545 Local

.                                                                02

Pathologist provided ICD-10:

O82, Z3A.38, Z37.0

.                                                                02

CPT                                                        .

872148

Specimen Comment: A courtesy copy of this report has been sent to [phone]

Specimen Comment: Report sent to 

***Performed at:  01

   [facility]

   [address], [place]  [id number]

   MD Luke Arreguin MD Phone:  [phone]

***Performed at:  02

   [facility]

   [address], [place]  [id number]

   MD Tej Saini MD Phone:  [phone]

## 2020-01-11 ENCOUNTER — HOSPITAL ENCOUNTER (EMERGENCY)
Dept: HOSPITAL 63 - ER | Age: 30
Discharge: HOME | End: 2020-01-11
Payer: COMMERCIAL

## 2020-01-11 VITALS — DIASTOLIC BLOOD PRESSURE: 85 MMHG | SYSTOLIC BLOOD PRESSURE: 139 MMHG

## 2020-01-11 VITALS — BODY MASS INDEX: 39.71 KG/M2 | WEIGHT: 253 LBS | HEIGHT: 67 IN

## 2020-01-11 DIAGNOSIS — B95.0: ICD-10-CM

## 2020-01-11 DIAGNOSIS — J02.0: Primary | ICD-10-CM

## 2020-01-11 PROCEDURE — 96372 THER/PROPH/DIAG INJ SC/IM: CPT

## 2020-01-11 PROCEDURE — 99284 EMERGENCY DEPT VISIT MOD MDM: CPT

## 2020-01-11 PROCEDURE — 87880 STREP A ASSAY W/OPTIC: CPT

## 2020-01-11 NOTE — PHYS DOC
Adult General


Chief Complaint


Chief Complaint:  SORE THROAT





HPI


HPI


29-year-old female presents with sore throat. She has had congestion, runny 

nose, general malaise for nearly the past 2 weeks. 4 days ago, she started to 

get over a cough and the nasal congestion improved but her sore throat has been 

getting worse. She now noticed that her tonsils are touching in the back. It is 

becoming more difficult to swallow fluids. She decided she should come in for 

evaluation. She is not having any difficulty breathing. She denies fever or 

chills.





Review of Systems


Review of Systems





Constitutional: Denies fever or chills []


Eyes: Denies change in visual acuity, redness, or eye pain []


HENT: Sore throat []


Respiratory: Denies cough or shortness of breath []


Cardiovascular: No additional information not addressed in HPI []


GI: Denies abdominal pain, nausea, vomiting, bloody stools or diarrhea []


: Denies dysuria or hematuria []


Musculoskeletal: Denies back pain or joint pain []


Integument: Denies rash or skin lesions []


Neurologic: Denies headache, focal weakness or sensory changes []


Endocrine: Denies polyuria or polydipsia []





All other systems were reviewed and found to be within normal limits, except as 

documented in this note.





Current Medications


Current Medications





Current Medications








 Medications


  (Trade)  Dose


 Ordered  Sig/Raven  Start Time


 Stop Time Status Last Admin


Dose Admin


 


 Naproxen


  (Naprosyn)  500 mg  1X  ONCE  1/11/20 13:00


 1/11/20 13:01   














Allergies


Allergies





Allergies








Coded Allergies Type Severity Reaction Last Updated Verified


 


  No Known Drug Allergies    1/11/20 No











Physical Exam


Physical Exam





Constitutional: Well developed, obese, well nourished, no acute distress, non-

toxic appearance. []


HENT: Normocephalic, atraumatic, bilateral external ears normal, oropharynx is 

edematous with very enlarged tonsils touching jugular.[]


Eyes: PERRLA, EOMI, conjunctiva normal, no discharge. [] 


Neck: Anterior cervical lymphadenopathy. Normal range of motion, no tenderness, 

supple, no stridor. [] 


Cardiovascular:Heart rate regular rhythm, no murmur []


Lungs & Thorax:  Bilateral breath sounds clear to auscultation []


Abdomen: Bowel sounds normal, soft, no tenderness, no masses, no pulsatile 

masses. [] 


Skin: Warm, dry, no erythema, no rash. [] 


Back: No tenderness, no CVA tenderness. [] 


Extremities: No tenderness, no cyanosis, no clubbing, ROM intact, no edema. [] 


Neurologic: Alert and oriented X 3, normal motor function, normal sensory 

function, no focal deficits noted. []


Psychologic: Affect normal, judgement normal, mood normal. []





EKG


EKG


[]





Radiology/Procedures


Radiology/Procedures


[]





Course & Med Decision Making


Course & Med Decision Making


Pertinent Labs and Imaging studies reviewed. (See chart for details)


The side of the patient's tonsils, I have ordered 125 Solu-Medrol IM. The 

patient is positive for Strep A. I will treat her with Bicillin injection. She 

is stable for discharge at this time.


[]





Dragon Disclaimer


Dragon Disclaimer


This electronic medical record was generated, in whole or in part, using a voice

 recognition dictation system.





Departure


Departure:


Impression:  


   Primary Impression:  


   Strep pharyngitis


Disposition:  01 HOME, SELF-CARE


Condition:  STABLE


Referrals:  


JAMES EVANS MD (PCP)


Patient Instructions:  Strep Throat, Easy-to-Read











JASNO PEARCE DO                 Jan 11, 2020 13:00

## 2020-09-24 ENCOUNTER — HOSPITAL ENCOUNTER (OUTPATIENT)
Dept: HOSPITAL 61 - LAB | Age: 30
Discharge: HOME | End: 2020-09-24
Attending: NURSE PRACTITIONER
Payer: COMMERCIAL

## 2020-09-24 DIAGNOSIS — E04.2: Primary | ICD-10-CM

## 2020-09-24 LAB
T4 FREE SERPL-MCNC: 1.07 NG/DL (ref 0.76–1.46)
THYROID STIM HORMONE (TSH): 4.1 UIU/ML (ref 0.36–3.74)

## 2020-09-24 PROCEDURE — 84439 ASSAY OF FREE THYROXINE: CPT

## 2020-09-24 PROCEDURE — 84443 ASSAY THYROID STIM HORMONE: CPT

## 2020-09-24 PROCEDURE — 36415 COLL VENOUS BLD VENIPUNCTURE: CPT

## 2020-09-24 PROCEDURE — 84480 ASSAY TRIIODOTHYRONINE (T3): CPT

## 2020-10-08 ENCOUNTER — HOSPITAL ENCOUNTER (OUTPATIENT)
Dept: HOSPITAL 61 - KCIC US | Age: 30
End: 2020-10-08
Attending: NURSE PRACTITIONER
Payer: COMMERCIAL

## 2020-10-08 DIAGNOSIS — E04.2: Primary | ICD-10-CM

## 2020-10-08 PROCEDURE — 76536 US EXAM OF HEAD AND NECK: CPT

## 2020-10-08 NOTE — KCIC
THYROID ULTRASOUND

 

History: Reason: Multinodular goiter / Spl. Instructions:  / History: 

 

Comparison:  None.

 

Technique: Multiple grayscale and color Doppler images of the thyroid 

gland were obtained.

 

Findings: 

Right thyroid lobe: 6.2 x 2.6 x 2.4 cm. Heterogeneous with hypoechoic 

nodularity

Left thyroid lobe: 5.5 x 2.2 x 2.2 cm. Heterogeneous with hypoechoic 

nodularity.

Isthmus: 0.6 cm.

 

No discrete thyroid nodule identified.

 

IMPRESSION:

1.  Enlarged diffusely heterogeneous thyroid, may represent nonspecific 

thyroiditis such as Hashimoto's thyroiditis. Recommend correlation with 

thyroid lab values.

 

 

 

Electronically signed by: Slim Niño DO (10/8/2020 5:30 PM) [id number]
